# Patient Record
Sex: FEMALE | Race: BLACK OR AFRICAN AMERICAN | URBAN - METROPOLITAN AREA
[De-identification: names, ages, dates, MRNs, and addresses within clinical notes are randomized per-mention and may not be internally consistent; named-entity substitution may affect disease eponyms.]

---

## 2023-05-19 ENCOUNTER — OFFICE VISIT (OUTPATIENT)
Dept: URBAN - METROPOLITAN AREA CLINIC 84 | Facility: CLINIC | Age: 33
End: 2023-05-19
Payer: MEDICAID

## 2023-05-19 ENCOUNTER — WEB ENCOUNTER (OUTPATIENT)
Dept: URBAN - METROPOLITAN AREA CLINIC 84 | Facility: CLINIC | Age: 33
End: 2023-05-19

## 2023-05-19 ENCOUNTER — DASHBOARD ENCOUNTERS (OUTPATIENT)
Age: 33
End: 2023-05-19

## 2023-05-19 VITALS
DIASTOLIC BLOOD PRESSURE: 75 MMHG | TEMPERATURE: 98.1 F | HEART RATE: 76 BPM | SYSTOLIC BLOOD PRESSURE: 114 MMHG | WEIGHT: 138.6 LBS | BODY MASS INDEX: 23.66 KG/M2 | HEIGHT: 64 IN

## 2023-05-19 DIAGNOSIS — R19.7 DIARRHEA: ICD-10-CM

## 2023-05-19 DIAGNOSIS — K92.1 HEMATOCHEZIA: ICD-10-CM

## 2023-05-19 DIAGNOSIS — R93.3 ABNORMAL CT SCAN, COLON: ICD-10-CM

## 2023-05-19 DIAGNOSIS — K52.9 COLITIS: ICD-10-CM

## 2023-05-19 PROCEDURE — 99204 OFFICE O/P NEW MOD 45 MIN: CPT | Performed by: INTERNAL MEDICINE

## 2023-05-19 RX ORDER — CIPROFLOXACIN HYDROCHLORIDE 500 MG/1
TAKE ONE TABLET BY MOUTH TWICE A DAY FOR 7 DAYS TABLET, FILM COATED ORAL
Qty: 14 UNSPECIFIED | Refills: 0 | Status: ACTIVE | COMMUNITY

## 2023-05-19 RX ORDER — POLYETHYLENE GLYCOL 3350, SODIUM CHLORIDE, SODIUM BICARBONATE, POTASSIUM CHLORIDE 420; 11.2; 5.72; 1.48 G/4L; G/4L; G/4L; G/4L
AS DIRECTED POWDER, FOR SOLUTION ORAL ONCE
Qty: 1 BOTTLE | Refills: 0 | OUTPATIENT
Start: 2023-05-19 | End: 2023-05-20

## 2023-05-19 RX ORDER — DICYCLOMINE HYDROCHLORIDE 20 MG/1
1- 2 TABLETS TABLET ORAL
Qty: 60 | Refills: 2 | OUTPATIENT
Start: 2023-05-19 | End: 2023-08-17

## 2023-05-19 RX ORDER — METRONIDAZOLE 500 MG/1
TABLET, FILM COATED ORAL
Qty: 21 TABLET | Status: ACTIVE | COMMUNITY

## 2023-05-19 RX ORDER — FAMOTIDINE 20 MG/1
TAKE ONE TABLET BY MOUTH TWICE A DAY FOR 15 DAYS TABLET, FILM COATED ORAL
Qty: 12 UNSPECIFIED | Refills: 0 | Status: ACTIVE | COMMUNITY

## 2023-05-19 RX ORDER — DICYCLOMINE HYDROCHLORIDE 20 MG/1
TAKE ONE TABLET BY MOUTH TWICE A DAY FOR 10 DAYS TABLET ORAL
Qty: 12 UNSPECIFIED | Refills: 0 | Status: ACTIVE | COMMUNITY

## 2023-05-19 NOTE — HPI-TODAY'S VISIT:
Patient presenting for ER f/u.  ED records reviewed in clinic today.  She went to the ED earlier this week for diarrhea with abdominal pain and hematochezia.  In  the ED she has pancolitis on CT Scan.  She had a mild microcytic anemia.  She was d/jessica on Cipro/Flagyl.  She still does not feel well.  She still has abdominal pain in the mid-abdomen.  The diarrhea has not improved at all.  She stil has blood in the stool.  She is passing clots.  She has some chills but no fever.  She has had similar symptoms in the past.  In general she has irregualr BM's.  She does have occaisonal diarrhea and soft stool.  She has intermittent hematochezia for years as well.  She has chronic intermittment abdominal. pain.  She has an erratic appetite.  She has intermittent weight loss.  patient had prior EGD/colon in 2015 with some polyps.  She had repeat EGD/colon in 2020.  EGD had mild gastritis and Colon was normal was normal except for internal hemorrhoids.  There is an indication that banding was performed.  She denies regular symptoms.  There is no FHx of IBD.  There is no FHx of colon cancer.  She denies rash or joint swelling.

## 2023-05-19 NOTE — PHYSICAL EXAM GASTROINTESTINAL
Abdomen , soft, mid-abdomen tender, nondistended , no guarding or rigidity , no masses palpable , normal bowel sounds , Liver and Spleen , no hepatomegaly present , no hepatosplenomegaly , liver nontender , spleen not palpable

## 2023-05-22 ENCOUNTER — OFFICE VISIT (OUTPATIENT)
Dept: URBAN - METROPOLITAN AREA CLINIC 29 | Facility: CLINIC | Age: 33
End: 2023-05-22

## 2023-06-07 ENCOUNTER — OFFICE VISIT (OUTPATIENT)
Dept: URBAN - METROPOLITAN AREA SURGERY CENTER 20 | Facility: SURGERY CENTER | Age: 33
End: 2023-06-07

## 2023-06-27 ENCOUNTER — OFFICE VISIT (OUTPATIENT)
Dept: URBAN - METROPOLITAN AREA SURGERY CENTER 20 | Facility: SURGERY CENTER | Age: 33
End: 2023-06-27

## 2024-08-20 ENCOUNTER — LAB OUTSIDE AN ENCOUNTER (OUTPATIENT)
Dept: URBAN - METROPOLITAN AREA CLINIC 118 | Facility: CLINIC | Age: 34
End: 2024-08-20

## 2024-08-20 ENCOUNTER — OFFICE VISIT (OUTPATIENT)
Dept: URBAN - METROPOLITAN AREA CLINIC 118 | Facility: CLINIC | Age: 34
End: 2024-08-20
Payer: COMMERCIAL

## 2024-08-20 VITALS
SYSTOLIC BLOOD PRESSURE: 128 MMHG | HEIGHT: 64 IN | TEMPERATURE: 97.7 F | BODY MASS INDEX: 24.48 KG/M2 | WEIGHT: 143.4 LBS | DIASTOLIC BLOOD PRESSURE: 81 MMHG | HEART RATE: 86 BPM

## 2024-08-20 DIAGNOSIS — R10.84 GENERALIZED ABDOMINAL PAIN: ICD-10-CM

## 2024-08-20 DIAGNOSIS — Z86.010 PERSONAL HISTORY OF COLONIC POLYPS: ICD-10-CM

## 2024-08-20 DIAGNOSIS — K62.5 RECTAL BLEEDING: ICD-10-CM

## 2024-08-20 PROBLEM — 428283002: Status: ACTIVE | Noted: 2024-08-20

## 2024-08-20 PROCEDURE — 99214 OFFICE O/P EST MOD 30 MIN: CPT | Performed by: INTERNAL MEDICINE

## 2024-08-20 RX ORDER — HYDROCORTISONE 2.5% 25 MG/G
1 APPLICATION CREAM TOPICAL TWICE A DAY
Qty: 30 | Refills: 3 | OUTPATIENT
Start: 2024-08-20 | End: 2024-12-17

## 2024-08-20 RX ORDER — METRONIDAZOLE 500 MG/1
TABLET, FILM COATED ORAL
Qty: 21 TABLET | Status: ON HOLD | COMMUNITY

## 2024-08-20 RX ORDER — CIPROFLOXACIN HYDROCHLORIDE 500 MG/1
TAKE ONE TABLET BY MOUTH TWICE A DAY FOR 7 DAYS TABLET, FILM COATED ORAL
Qty: 14 UNSPECIFIED | Refills: 0 | Status: ON HOLD | COMMUNITY

## 2024-08-20 RX ORDER — FAMOTIDINE 20 MG/1
TAKE ONE TABLET BY MOUTH TWICE A DAY FOR 15 DAYS TABLET, FILM COATED ORAL
Qty: 12 UNSPECIFIED | Refills: 0 | Status: ON HOLD | COMMUNITY

## 2024-08-20 RX ORDER — DICYCLOMINE HYDROCHLORIDE 20 MG/1
TAKE ONE TABLET BY MOUTH TWICE A DAY FOR 10 DAYS TABLET ORAL
Qty: 12 UNSPECIFIED | Refills: 0 | Status: ON HOLD | COMMUNITY

## 2024-08-20 NOTE — HPI-TODAY'S VISIT:
pt presents for GI evaluation. Notes rectal bleeding started 2015 with colonoscopy showing polyps. Notes recurrent symptoms with egd/colon 2020 with reported hiatal hernia and gastritis. pt reports developed loose stools with rectal bleeding in 2023, ct scan at that time with pancolitis and pt told possible IBD but did not have repeat colonoscopy. pt reports now mostly constipation but intermittent loose bloody stools 3-4 per day. Notes some abdominal cramping and bloating. No weight loss, notes h/o anemia. No UGI symptoms. pt referred for further evaluation.

## 2024-08-26 ENCOUNTER — OFFICE VISIT (OUTPATIENT)
Dept: URBAN - METROPOLITAN AREA CLINIC 118 | Facility: CLINIC | Age: 34
End: 2024-08-26

## 2024-09-04 ENCOUNTER — OFFICE VISIT (OUTPATIENT)
Dept: URBAN - METROPOLITAN AREA SURGERY CENTER 23 | Facility: SURGERY CENTER | Age: 34
End: 2024-09-04

## 2024-09-05 ENCOUNTER — OFFICE VISIT (OUTPATIENT)
Dept: URBAN - METROPOLITAN AREA CLINIC 118 | Facility: CLINIC | Age: 34
End: 2024-09-05

## 2024-10-02 ENCOUNTER — TELEPHONE ENCOUNTER (OUTPATIENT)
Dept: URBAN - METROPOLITAN AREA CLINIC 118 | Facility: CLINIC | Age: 34
End: 2024-10-02

## 2024-10-15 ENCOUNTER — OFFICE VISIT (OUTPATIENT)
Dept: URBAN - METROPOLITAN AREA SURGERY CENTER 23 | Facility: SURGERY CENTER | Age: 34
End: 2024-10-15
Payer: COMMERCIAL

## 2024-10-15 ENCOUNTER — TELEPHONE ENCOUNTER (OUTPATIENT)
Dept: URBAN - METROPOLITAN AREA CLINIC 118 | Facility: CLINIC | Age: 34
End: 2024-10-15

## 2024-10-15 ENCOUNTER — CLAIMS CREATED FROM THE CLAIM WINDOW (OUTPATIENT)
Dept: URBAN - METROPOLITAN AREA CLINIC 4 | Facility: CLINIC | Age: 34
End: 2024-10-15
Payer: COMMERCIAL

## 2024-10-15 DIAGNOSIS — K64.8 OTHER HEMORRHOIDS: ICD-10-CM

## 2024-10-15 DIAGNOSIS — K63.89 OTHER SPECIFIED DISEASES OF INTESTINE: ICD-10-CM

## 2024-10-15 DIAGNOSIS — K62.5 RECTAL BLEEDING: ICD-10-CM

## 2024-10-15 DIAGNOSIS — R19.4 CHANGE IN BOWEL HABIT: ICD-10-CM

## 2024-10-15 DIAGNOSIS — K62.5 ANAL BLEEDING: ICD-10-CM

## 2024-10-15 PROCEDURE — 45380 COLONOSCOPY AND BIOPSY: CPT | Performed by: INTERNAL MEDICINE

## 2024-10-15 PROCEDURE — 00811 ANES LWR INTST NDSC NOS: CPT | Performed by: NURSE ANESTHETIST, CERTIFIED REGISTERED

## 2024-10-15 PROCEDURE — 88305 TISSUE EXAM BY PATHOLOGIST: CPT | Performed by: PATHOLOGY

## 2024-10-15 RX ORDER — HYDROCORTISONE 2.5% 25 MG/G
1 APPLICATION CREAM TOPICAL TWICE A DAY
Qty: 30 | Refills: 3 | OUTPATIENT
Start: 2024-10-15 | End: 2025-02-11

## 2024-10-15 RX ORDER — METRONIDAZOLE 500 MG/1
TABLET, FILM COATED ORAL
Qty: 21 TABLET | Status: ON HOLD | COMMUNITY

## 2024-10-15 RX ORDER — CIPROFLOXACIN HYDROCHLORIDE 500 MG/1
TAKE ONE TABLET BY MOUTH TWICE A DAY FOR 7 DAYS TABLET, FILM COATED ORAL
Qty: 14 UNSPECIFIED | Refills: 0 | Status: ON HOLD | COMMUNITY

## 2024-10-15 RX ORDER — HYDROCORTISONE 2.5% 25 MG/G
1 APPLICATION CREAM TOPICAL TWICE A DAY
Qty: 30 | Refills: 3 | Status: ACTIVE | COMMUNITY
Start: 2024-08-20 | End: 2024-12-17

## 2024-10-15 RX ORDER — FAMOTIDINE 20 MG/1
TAKE ONE TABLET BY MOUTH TWICE A DAY FOR 15 DAYS TABLET, FILM COATED ORAL
Qty: 12 UNSPECIFIED | Refills: 0 | Status: ON HOLD | COMMUNITY

## 2024-10-15 RX ORDER — DICYCLOMINE HYDROCHLORIDE 20 MG/1
TAKE ONE TABLET BY MOUTH TWICE A DAY FOR 10 DAYS TABLET ORAL
Qty: 12 UNSPECIFIED | Refills: 0 | Status: ON HOLD | COMMUNITY